# Patient Record
Sex: FEMALE | Race: WHITE
[De-identification: names, ages, dates, MRNs, and addresses within clinical notes are randomized per-mention and may not be internally consistent; named-entity substitution may affect disease eponyms.]

---

## 2020-12-08 ENCOUNTER — HOSPITAL ENCOUNTER (INPATIENT)
Dept: HOSPITAL 46 - FBC | Age: 30
LOS: 5 days | Discharge: HOME | End: 2020-12-13
Attending: OBSTETRICS & GYNECOLOGY | Admitting: OBSTETRICS & GYNECOLOGY
Payer: COMMERCIAL

## 2020-12-08 VITALS — BODY MASS INDEX: 48.82 KG/M2 | HEIGHT: 65 IN | WEIGHT: 293 LBS

## 2020-12-08 DIAGNOSIS — O34.211: Primary | ICD-10-CM

## 2020-12-08 DIAGNOSIS — E66.01: ICD-10-CM

## 2020-12-08 DIAGNOSIS — Z30.2: ICD-10-CM

## 2020-12-08 DIAGNOSIS — Z3A.39: ICD-10-CM

## 2020-12-08 DIAGNOSIS — J45.909: ICD-10-CM

## 2020-12-08 DIAGNOSIS — N85.8: ICD-10-CM

## 2020-12-08 DIAGNOSIS — G47.33: ICD-10-CM

## 2020-12-08 DIAGNOSIS — Z87.891: ICD-10-CM

## 2020-12-08 DIAGNOSIS — Z86.59: ICD-10-CM

## 2020-12-08 PROCEDURE — A9270 NON-COVERED ITEM OR SERVICE: HCPCS

## 2020-12-10 PROCEDURE — 0UT70ZZ RESECTION OF BILATERAL FALLOPIAN TUBES, OPEN APPROACH: ICD-10-PCS | Performed by: OBSTETRICS & GYNECOLOGY

## 2020-12-10 NOTE — NUR
12/10/20 1521 Eleonora Alfaro
1447 PT ARRIVED TO PACU, PT PLACED ON 6L VIA MASK PT RESTING AND VSS.
PT WAKES TO TACTILE STIMULI AND REPORTS PAIN, PT REORIENTED TO PACU.
PT MOANING WITH FUNDAL CHECK.
 
1450 O2 REMOVED. PT ENCOURAGED TO DEEP BREATHE.
 
1504 PAIN MEDICATION GIVEN, PT REPORTS PAIN 8-9/10.
 
1509 PT REPORTS NO CHANGE IN PAIN, HOB INCREASED. PAIN MEDICATION
GIVEN. PT CONTINUES TO MOAN DURING FUNDAL CHECK.
 
1516 PT ASLEEP OFF AND ON, O2 SAT DECREASES TO HIGH 80S. HOB INCREASED
AND RN HELPS PT BRACE ABD, AND PT COUGHING AND ENCOURAGED TO DEEP
BREATHE.
 
1519 CRNA CALLED AND NEW ORDERS RECEIVED.

## 2020-12-11 NOTE — PR
[facility]
                                    [address]
                                  [place], [place]  [address]
_________________________________________________________________________________________
                                                                 Signed   
 
 
===================================
PP Progress Notes
===================================
Datetime Report Generated by CPN: 2020 07:12
   
SUBJECTIVE:  [id number]
Pain:  Within Normal Limits
Nausea/Vomiting:  Denies
Flatus:  Yes
Vital Signs:  [id number]
Vital Signs:  Reviewed; Within Normal Limits
POSTPARTUM EXAM:  Ongoing
Cardiovascular:  Normal
Respiratory:  Normal
Abdomen/Uterus:  Normal
Lochia:  Normal
Vulva/Perineum:  Not Done
Breasts:  Not Done
CVA Tenderness:  Normal
Extremities:  Normal
 Incision:  Normal
Breastfeeding Progress:  Not Applicable
Exam Comments:  Fundus firm nontender. Incision bandaged and dry
IMPRESSION/PLAN/PROCEDURES:  [id number]
Impression:  Normal Postpartum Progression
Plan:  Continue Present Management
Progress Notes:  Pt seen and examined. Doing well. Ambulating and tolerating full diet.
   Camacho cath in place. Some lower back discomfort from laying in bed. No fevers/chills.
   Bottlefeeding. Continue routine postpartum care.
Signing Physician:  Gillian Valdes DO
 
 
Copies:                                
~
 
 
 
 
 
 
 
 
 
*Electronically Signed*  20  0712  GILLIAN VALDES DO               
                                                                       
_________________________________________________________________________________________
PATIENT NAME:     BALTAZAR GARBER                  
MEDICAL RECORD #: [id number]                     PROGRESS NOTE                 
          ACCT #: [id number]  
DATE OF BIRTH:   [birth year]                                       
PHYSICIAN:   GILLIAN VALDES DO                      RPT #: [id number]
REPORT IS CONFIDENTIAL AND NOT TO BE RELEASED WITHOUT AUTHORIZATION

## 2020-12-12 NOTE — PR
[facility]
                                    [address]
                                  [place], [place]  [address]
_________________________________________________________________________________________
                                                                 Signed   
 
 
===================================
PP Progress Notes
===================================
Datetime Report Generated by CPN: 2020 08:46
   
SUBJECTIVE:  [id number]
Pain:  Within Normal Limits
Nausea/Vomiting:  Denies
Flatus:  Yes
Vital Signs:  [id number]
Vital Signs:  Reviewed
POSTPARTUM EXAM:  Ongoing
Cardiovascular:  Normal
Respiratory:  Normal
Abdomen/Uterus:  Normal
Lochia:  Normal
Vulva/Perineum:  Not Done
Breasts:  Not Done
CVA Tenderness:  Normal
Extremities:  Normal
 Incision:  Normal
Breastfeeding Progress:  Not Applicable
Exam Comments:  Fundus firm nontender. Difficult to palpate due to body habitus. Incision
   well healing w/ staples in place.
IMPRESSION/PLAN/PROCEDURES:  [id number]
Impression:  Normal Postpartum Progression
Plan:  Continue Present Management
Progress Notes:  PT doing well. Continue routine postpartum/postop care. Anticipate d/c
   home tomorrow.
Signing Physician:  Gillian Valdes DO
 
 
Copies:                                
~
 
 
 
 
 
 
 
 
 
*Electronically Signed*  20  0846  GILLIAN VALDES DO               
                                                                       
_________________________________________________________________________________________
PATIENT NAME:     BALTAZAR GARBER                  
MEDICAL RECORD #: [id number]                     PROGRESS NOTE                 
          ACCT #: [id number]  
DATE OF BIRTH:   [birth year]                                       
PHYSICIAN:   GILLIAN VALDES DO                      RPT #: [id number]
REPORT IS CONFIDENTIAL AND NOT TO BE RELEASED WITHOUT AUTHORIZATION

## 2020-12-13 NOTE — PR
[facility]
                                    [address], [place]  [address]
_________________________________________________________________________________________
                                                                 Signed   
 
 
===================================
PP Progress Notes
===================================
Datetime Report Generated by CPN: 2020 08:41
   
SUBJECTIVE:  [id number]
Pain:  Within Normal Limits
Nausea/Vomiting:  Denies
Flatus:  Yes
Vital Signs:  [id number]
Vital Signs:  Reviewed
POSTPARTUM EXAM:  Ongoing
Cardiovascular:  Normal
Respiratory:  Normal
Abdomen/Uterus:  Normal
Lochia:  Normal
Vulva/Perineum:  Not Done
Breasts:  Not Done
CVA Tenderness:  Normal
Extremities:  Normal
 Incision:  Normal
Breastfeeding Progress:  Not Applicable
Exam Comments:  Incision well healing w/ staples in place. 
IMPRESSION/PLAN/PROCEDURES:  [id number]
Impression:  Normal Postpartum Progression
Plan:  Discharge
Procedures:  None
Progress Notes:  Pt seen and examined. Doing well. Ambulating, voiding, and tolerating
   full diet. Pain and lochia minimal. Breast and bottlefeeding. No fevers/chills or
   other concerns. Desires d/c home today. Reviewed d/c instructions in detail. S/P BTL
   for pp contraception. Will discuss cycle control/endometrial protection in follow up.
   Discussed incision care in detail and will return in 2-3 days for staple removal/wound
   check
Signing Physician:  Gillian Valdes DO
 
 
Copies:                                
~
 
 
 
 
 
*Electronically Signed*  20  0841  GILLIAN VALDES DO               
                                                                       
_________________________________________________________________________________________
PATIENT NAME:     BALTAZAR GARBER                  
MEDICAL RECORD #: [id number]                     PROGRESS NOTE                 
          ACCT #: [id number]  
DATE OF BIRTH:   [birth year]                                       
PHYSICIAN:   GILLIAN VALDES DO                      RPT #: [id number]
REPORT IS CONFIDENTIAL AND NOT TO BE RELEASED WITHOUT AUTHORIZATION

## 2023-08-05 NOTE — OR
[facility]
                                    [address], [place]  [address]
_________________________________________________________________________________________
                                                                 Draft    
 
 
DATE OF OPERATION:
12/10/2020
 
SURGEON:
Gillian Valdes DO
 
PREOPERATIVE DIAGNOSES:
1. Term pregnancy at 39 weeks gestation.
2. History of prior  x3.
3. Desires bilateral tubal ligation.
4. Morbid obesity with BMI of 68.
 
POSTOPERATIVE DIAGNOSES:
1. Term pregnancy at 39 weeks gestation.
2. History of prior  x3.
3. Desires bilateral tubal ligation.
4. Morbid obesity with BMI of 68.
 
PROCEDURES PERFORMED:
1. Repeat low transverse  delivery.
2. Bilateral salpingectomy.
 
ANESTHESIA:
General.
 
ASSISTANTS:
1. Thomas Schmitt MD.
2. Jasmin Camacho DO.
 
ESTIMATED BLOOD LOSS:
700 mL.
 
COMPLICATIONS:
None.
 
FINDINGS:
A viable female .  Weight and Apgars pending.
 
INDICATIONS:
Ms. German is a very pleasant 30-year-old G4, P3 female with history of 2 prior
C-sections, who presents for repeat low transverse  delivery.  Pregnancy
complicated by morbid obesity, obstructive sleep apnea, and 3 prior C-sections.  She
 
                                                                                    
_________________________________________________________________________________________
PATIENT NAME:     BALTAZAR GERMAN                   
MEDICAL RECORD #: [id number]            OPERATIVE REPORT              
          ACCT #: [id number]  
DATE OF BIRTH:   [birth year]            REPORT #: [id number]      
PHYSICIAN:        GILLIAN VALDES DO               
PCP:              IVANA VILLATORO            
REPORT IS CONFIDENTIAL AND NOT TO BE RE[facility]AS[facility]
                                    [address], [place]  [address]
_________________________________________________________________________________________
                                                                 Draft    
 
 
desires repeat low transverse  delivery with bilateral tubal ligation.  Risks,
benefits, and alternatives were discussed in detail with the patient.  The patient
understands and wishes to proceed with the procedure.  Sterilization consents were
previously signed and ethics committee approval was obtained to proceed with tubal
ligation. 
 
TECHNIQUE:
The patient was taken to the operating room.  A time-out was performed to confirm
correct patient and correct procedure.  Ancef 3 g preoperatively were given.  ICPs were
on and running and a Camacho catheter was inserted.  General anesthetic was adequately
established.  The patient was prepped and draped in the supine position with a bump
under the right hip.  A Pfannenstiel skin incision was made through the skin and carried
down to the fascia.  The fascia was nicked in the midline.  The fascial incision was
extended bilaterally using curved Mcfarlane scissors.  The rectus muscles were divided in the
midline.  The peritoneum was grasped, elevated, and incised sharply.  Peritoneal
incision was extended bilaterally using blunt dissection.  Lower uterine segment was
identified and the baby was previously noted to be in breech presentation.  Hysterotomy
was then performed using a surgical scalpel and the fetal buttocks were noted.  Due to
body habitus and large fetal size, there was inadequate space for delivery and Maylard
incision was made cutting the rectus muscles bilaterally.  The fetal buttocks were then
delivered and the ileum grasped.  The fetal body was delivered to the axilla and the
anterior arm was swept medially and delivered.  The  was then rotated to 180
degrees and now anterior arm was swept medially and delivered.  The  was then
rotated 90 degrees to occiput anterior position.  The fetal head was flexed and the
 was delivered.   was vigorous and cried at delivery.  Cord was doubly
clamped and cut and the  handed to the waiting pediatric team for further care.
Cord gases were obtained and cord blood was obtained for routine analysis.  The placenta
was then manually expressed, intact with a centrally inserted 3-vessel cord.  The uterus
was cleared of any remaining products of conception and clot and hysterotomy was
examined with no extensions noted.  Hysterotomy was then repaired in two layers using 0
Monocryl with the 1st layer being running locked and the 2nd imbricating in the vertical
manner.  A small amount of oozing was noted in the midline.  This was made hemostatic
with a figure-of-eight of 0 Monocryl.  The pelvis was irrigated and found to be
hemostatic.  Attention was then turned to the fallopian tubes.  Normal tubes and ovaries
were noted bilaterally.  The mesosalpinx was then clamped using a Eliana clamp after a
window was made in the mid portion of the mesosalpinx.  The fallopian tube was then
dissected and sent to pathology for further evaluation.  The mesosalpinx was ligated
with 2-0 chromic with excellent hemostasis.  The process was repeated on the left side
with excellent hemostasis.  The pelvis was again irrigated and found to be hemostatic.
The Russell self retractor was removed and scant omental adhesions were noted well above
fascial incision and decision was made to leave these intact.  ACell sheet was then
applied to the lower uterine segment and peritoneum was reapproximated using 2-0 Vicryl
 
                                                                                    
_________________________________________________________________________________________
PATIENT NAME:     BALTAZAR GERMAN                   
MEDICAL RECORD #: [id number]            OPERATIVE REPORT              
          ACCT #: [id number]  
DATE OF BIRTH:   [birth year]            REPORT #: [id number]      
PHYSICIAN:        GILLIAN VALDES DO               
PCP:              IVANA VILLATORO            
REPORT IS CONFIDENTIAL AND NOT TO BE RELEASED WITHOUT AUTHORIZATION
 
 
                                  [address]  [address]
_________________________________________________________________________________________
                                                                 Draft    
 
 
in a running nonlocked manner.  The rectus muscles were examined and made hemostatic
with Bovie electrocautery.  Maylard incision of the rectus was repaired using 2-0
chromic and figure-of-eight sutures.  ACell powder was then applied to the rectus.
Fascia was then reapproximated using 0 Vicryl in a running nonlocked manner.  Subcu was
examined and made hemostatic with judicious use of Bovie electrocautery.  Subcu was
closed in several layers using 2-0 Vicryl with good reapproximation of subcu tissue.
Skin was then reapproximated using surgical staples.  The uterus was Crede'd for small
amount of blood and uterus was noted to be firm.  The patient was then taken to PACU in
good and stable condition.  Sponge, 
needle, and instrument count was correct x2 at the end of the procedure.  Dr. Schmitt
and Dr. Camacho were present and participated in all portions of the procedure. 
 
 
 
            ________________________________________
            DO LINN Babcock/CONSTANZA
Job #:  [id number]
DD:  2020 23:14:16
DT:  2020 00:03:55
 
 
Copies:                                
~
 
 
 
 
 
 
 
 
 
 
 
 
 
 
 
 
 
                                                                                    
_________________________________________________________________________________________
PATIENT NAME:     BALTAZAR GERMAN                   
MEDICAL RECORD #: [id number]            OPERATIVE REPORT              
          ACCT #: [id number]  
DATE OF BIRTH:   [birth year]            REPORT #: [id number]      
PHYSICIAN:        GILLIAN VALDES DO               
PCP:              IVANA VILLATORO            
REPORT IS CONFIDENTIAL AND NOT TO BE RELEASED WITHOUT AUTHORIZATION [Wheezing] : wheezing [Rhonchi] : rhonchi [Subcostal Retractions] : no subcostal retractions [Suprasternal Retractions] : no suprasternal retractions [NL] : warm, clear